# Patient Record
Sex: FEMALE | Race: WHITE | NOT HISPANIC OR LATINO | URBAN - METROPOLITAN AREA
[De-identification: names, ages, dates, MRNs, and addresses within clinical notes are randomized per-mention and may not be internally consistent; named-entity substitution may affect disease eponyms.]

---

## 2017-04-06 NOTE — PATIENT DISCUSSION
FUCHS ENDOTHELIAL DYSTROPHY OU - s/p DSAEK OU - Pt doing very well. Okay to decrease Pred Forte one drop every other day OU. If photophobia or discomfort increases will increase Pred Forte as needed.

## 2017-04-06 NOTE — PATIENT DISCUSSION
Continue: Pred Forte (prednisolone acetate): drops,suspension: 1% 1 drop every other day into right eye

## 2017-04-06 NOTE — PATIENT DISCUSSION
Continue: Pred Forte (prednisolone acetate): drops,suspension: 1% 1 drop every morning into left eye

## 2018-02-08 NOTE — PATIENT DISCUSSION
Continue: Pred Forte (prednisolone acetate): drops,suspension: 1% 1 drop four times a day as directed into both eyes

## 2019-01-31 NOTE — PATIENT DISCUSSION
FUCH'S ENDOTHELIAL DYSTROPHY,OU:  MODERATE GUTTATA AND MILD EDEMA. PRESCRIBE JOSÉ GTTS TID-QID AND JOSÉ TUNDE QHS. RETURN FOR FOLLOW-UP AS SCHEDULED.

## 2019-01-31 NOTE — PATIENT DISCUSSION
Continue: Pred Forte (prednisolone acetate): drops,suspension: 1% 1 drop every other day as directed into both eyes 11-

## 2019-01-31 NOTE — PATIENT DISCUSSION
stop Timolol 2-3 weeks prior to next visit to see if she needs Timolol or can stop using. Was put on Timolol by  in [place] years ago.

## 2019-07-25 NOTE — PATIENT DISCUSSION
Stopped Today: timolol maleate (timolol maleate): drops: 0.5% 1 drop twice a day as directed into both eyes 05-

## 2019-12-19 NOTE — PATIENT DISCUSSION
Continue: prednisolone acetate (prednisolone acetate): drops,suspension: 1% 1 drop every other day as directed into both eyes

## 2020-06-18 NOTE — PATIENT DISCUSSION
FUCH'S ENDOTHELIAL DYSTROPHY,OU: MODERATE GUTTATA AND MILD EDEMA. PRESCRIBE JOSÉ GTTS TID-QID AND JOÉS TUNDE QHS. RETURN FOR FOLLOW-UP AS SCHEDULED.

## 2020-09-03 NOTE — PATIENT DISCUSSION
Attempted to reach patient's daughter. No answer.Mailbox is full and unable to leave message. Please call again.   Pred Forte q other day OU

## 2020-12-17 NOTE — PATIENT DISCUSSION
If patient is planning on receiving a vaccinations, she needs to call at least one week prior for drop instructions. IOP WNL, can stay off Timolol at this time.

## 2020-12-17 NOTE — PATIENT DISCUSSION
Continue: prednisolone acetate (prednisolone acetate): drops,suspension: 1% 1 drop every other day as directed into both eyes 07-

## 2021-07-01 NOTE — PATIENT DISCUSSION
Continue: prednisolone acetate (prednisolone acetate): drops,suspension: 1% 1 drop every other day into both eyes

## 2021-07-01 NOTE — PATIENT DISCUSSION
Prescribe preservative free artificial tears. Recommend use of fish oil supplements and daily warm compresses. Limit fast moving air to eyes. Continue Restasis BID OU.

## 2021-07-01 NOTE — PATIENT DISCUSSION
S/P DSAEK OU. Continue Pred Forte q other day OU. Discussed decreasing drops. Patient should always have drops on hand. Patient elects to proceed with drops every other day.

## 2022-01-27 NOTE — PATIENT DISCUSSION
No YAG at this time: Noted PCO during the exam. Encouraged patient to analyze night time driving, glare and halos. Can proceed with a YAG in the future should symptoms become more troublesome.

## 2022-11-15 ENCOUNTER — ESTABLISHED PATIENT (OUTPATIENT)
Facility: LOCATION | Age: 62
End: 2022-11-15

## 2022-11-15 DIAGNOSIS — H35.371: ICD-10-CM

## 2022-11-15 DIAGNOSIS — H44.2D2: ICD-10-CM

## 2022-11-15 DIAGNOSIS — H35.373: ICD-10-CM

## 2022-11-15 DIAGNOSIS — H16.223: ICD-10-CM

## 2022-11-15 DIAGNOSIS — H43.813: ICD-10-CM

## 2022-11-15 DIAGNOSIS — H52.4: ICD-10-CM

## 2022-11-15 PROCEDURE — 92015KEC REFRACTION KEC

## 2022-11-15 PROCEDURE — 92134 CPTRZ OPH DX IMG PST SGM RTA: CPT

## 2022-11-15 PROCEDURE — 92014 COMPRE OPH EXAM EST PT 1/>: CPT

## 2022-11-15 ASSESSMENT — VISUAL ACUITY
OS_CC: 20/30+2
OS_CC: J1
OD_CC: 20/30-2
OD_CC: J1+-2

## 2022-11-15 ASSESSMENT — TONOMETRY
OD_IOP_MMHG: 14
OS_IOP_MMHG: 17

## 2023-11-14 ENCOUNTER — ESTABLISHED PATIENT (OUTPATIENT)
Facility: LOCATION | Age: 63
End: 2023-11-14

## 2023-11-14 DIAGNOSIS — H16.223: ICD-10-CM

## 2023-11-14 PROCEDURE — 92014 COMPRE OPH EXAM EST PT 1/>: CPT

## 2023-11-14 ASSESSMENT — TONOMETRY
OS_IOP_MMHG: 16
OD_IOP_MMHG: 19

## 2023-11-14 ASSESSMENT — VISUAL ACUITY
OS_CC: J3
OD_CC: J2
OD_CC: 20/30-1
OS_CC: 20/25

## 2024-11-19 ENCOUNTER — COMPREHENSIVE EXAM (OUTPATIENT)
Facility: LOCATION | Age: 64
End: 2024-11-19

## 2024-11-19 DIAGNOSIS — H44.2D2: ICD-10-CM

## 2024-11-19 DIAGNOSIS — Z96.1: ICD-10-CM

## 2024-11-19 DIAGNOSIS — H52.223: ICD-10-CM

## 2024-11-19 DIAGNOSIS — H16.223: ICD-10-CM

## 2024-11-19 DIAGNOSIS — H44.23: ICD-10-CM

## 2024-11-19 DIAGNOSIS — H35.373: ICD-10-CM

## 2024-11-19 DIAGNOSIS — H43.813: ICD-10-CM

## 2024-11-19 DIAGNOSIS — H52.4: ICD-10-CM

## 2024-11-19 PROCEDURE — 92015KEC REFRACTION KEC

## 2024-11-19 PROCEDURE — 92014 COMPRE OPH EXAM EST PT 1/>: CPT
